# Patient Record
Sex: MALE | Race: WHITE
[De-identification: names, ages, dates, MRNs, and addresses within clinical notes are randomized per-mention and may not be internally consistent; named-entity substitution may affect disease eponyms.]

---

## 2020-01-06 ENCOUNTER — HOSPITAL ENCOUNTER (OUTPATIENT)
Dept: HOSPITAL 25 - CHICATH | Age: 80
Discharge: HOME | End: 2020-01-06
Attending: SPECIALIST
Payer: MEDICARE

## 2020-01-06 VITALS — SYSTOLIC BLOOD PRESSURE: 111 MMHG | DIASTOLIC BLOOD PRESSURE: 69 MMHG

## 2020-01-06 DIAGNOSIS — I25.10: Primary | ICD-10-CM

## 2020-01-06 DIAGNOSIS — Z87.891: ICD-10-CM

## 2020-01-06 DIAGNOSIS — Z88.1: ICD-10-CM

## 2020-01-06 DIAGNOSIS — I10: ICD-10-CM

## 2020-01-06 DIAGNOSIS — R94.31: ICD-10-CM

## 2020-01-06 DIAGNOSIS — Z85.46: ICD-10-CM

## 2020-01-06 DIAGNOSIS — R94.39: ICD-10-CM

## 2020-01-06 DIAGNOSIS — R07.9: ICD-10-CM

## 2020-01-06 LAB
ANION GAP SERPL CALC-SCNC: 5 MMOL/L (ref 2–11)
BASOPHILS # BLD AUTO: 0.1 10^3/UL (ref 0–0.2)
BUN SERPL-MCNC: 19 MG/DL (ref 6–24)
BUN/CREAT SERPL: 24.4 (ref 8–20)
CALCIUM SERPL-MCNC: 8.2 MG/DL (ref 8.6–10.3)
CHLORIDE SERPL-SCNC: 107 MMOL/L (ref 101–111)
EOSINOPHIL # BLD AUTO: 0.1 10^3/UL (ref 0–0.6)
GLUCOSE SERPL-MCNC: 100 MG/DL (ref 70–100)
HCO3 SERPL-SCNC: 26 MMOL/L (ref 22–32)
HCT VFR BLD AUTO: 29 % (ref 42–52)
HGB BLD-MCNC: 9.3 G/DL (ref 14–18)
LYMPHOCYTES # BLD AUTO: 1.5 10^3/UL (ref 1–4.8)
MCH RBC QN AUTO: 20 PG (ref 27–31)
MCHC RBC AUTO-ENTMCNC: 32 G/DL (ref 31–36)
MCV RBC AUTO: 63 FL (ref 80–94)
MICROCYTES BLD QL SMEAR: (no result)
MONOCYTES # BLD AUTO: 0.6 10^3/UL (ref 0–0.8)
NEUTROPHILS # BLD AUTO: 2.8 10^3/UL (ref 1.5–7.7)
NRBC # BLD AUTO: 0 10^3/UL
NRBC BLD QL AUTO: 0.1
PLATELET # BLD AUTO: 238 10^3/UL (ref 150–450)
POLYCHROMASIA BLD QL SMEAR: (no result)
POTASSIUM SERPL-SCNC: (no result) MMOL/L (ref 3.5–5)
RBC # BLD AUTO: 4.6 10^6 /UL (ref 4.18–5.48)
SODIUM SERPL-SCNC: 138 MMOL/L (ref 135–145)
WBC # BLD AUTO: 5.1 10^3/UL (ref 3.5–10.8)

## 2020-01-06 PROCEDURE — 93454 CORONARY ARTERY ANGIO S&I: CPT

## 2020-01-06 PROCEDURE — 85060 BLOOD SMEAR INTERPRETATION: CPT

## 2020-01-06 PROCEDURE — 99156 MOD SED OTH PHYS/QHP 5/>YRS: CPT

## 2020-01-06 PROCEDURE — 80048 BASIC METABOLIC PNL TOTAL CA: CPT

## 2020-01-06 PROCEDURE — 85025 COMPLETE CBC W/AUTO DIFF WBC: CPT

## 2020-01-06 PROCEDURE — C1887 CATHETER, GUIDING: HCPCS

## 2020-01-06 PROCEDURE — 99157 MOD SED OTHER PHYS/QHP EA: CPT

## 2020-01-06 PROCEDURE — 36415 COLL VENOUS BLD VENIPUNCTURE: CPT

## 2020-01-06 NOTE — CATH
*[facility]*

[place] [address]

Main: [phone]

Fax: [phone]

[email or web address]



-------------------------------------------------------------------

Cardiac Catheterization



Patient: Osorio Kearney                MRN:        [id number]

:     1940                         Study Date: 2020

Age:     79                                 Accession#: [id number]

Gender:  M                                  HR:

Height:  64 in /162.6 cm                    BSA:        2.04 m^2

Weight:  196 lb /89.1 kg                    BMI:        33.7 kg/m^2



Cardiologist:        Kelton Nicole MD

 

Ordering Physician:  Kelton Nicole MD

Referring Physician: Kelton Nicole MD,

 

-------------------------------------------------------------------



- Left coronary angiography.

- Right coronary angiography.

- Left heart catheterization.



-------------------------------------------------------------------



Summary:   Right posterior descending:  Ostial lesion: There is a

50% stenosis.

Recommendations:  Non Critical Coronary Artery disease. Continue

hypertension medical therapy.



-------------------------------------------------------------------

History:   Risk factors:  Hypertension.



Labs, prior tests, procedures, and surgery:

Stress myocardial perfusion imaging.     Abnormal.   Low risk of

ischemia.



Blood tests:    International normalized ratio (INR) .  Serum

sodium (Na) of 138 mEq/l.  Serum creatinine (current admission) of

0.78 mg/dl.  Blood urea nitrogen of 19 mg/dl.  Glucose of 100

mg/dl.  Platelet count of 238 th/ul.  White blood cell count (WBC)

of 0.01 th/ul.  Red blood cell count (RBC) of 4600 th/ul.

Hematocrit of 29 %.  Hemoglobin (pre-procedure) of 9.3 g/dl.



-------------------------------------------------------------------

Study data:   Study status:  Cardiac cath: elective.  Location:

Catheterization laboratory.  Consent:  The risks, benefits, and

alternatives to the procedure were explained to the patient and/or

their healthcare representative and written informed consent was

obtained. All available pre-procedure labs were reviewed.  Height:

162.6 cm. 64 in.  Weight:  89.1 kg. 196 lb.  Body surface area:

2.04 m^2.  Body mass index:  33.7 kg/m^2.



-------------------------------------------------------------------

Procedure:



1.  Initial setup. The patient was brought to the laboratory.

    Surface ECG leads, blood pressure measurements, and pulse

    oximetric signals were monitored. A baseline seven lead ECG was

    recorded. A time out was observed per protocol.

2.  Skin preparation. The planned puncture sites were prepped and

    draped in the usual sterile manner.

3.  Local anesthesia. 1% lidocaine was administered.

4.  Sedation. was administered.

5.  Supplemental oxygen. Oxygen, 2 L/min was administered

    throughout the procedure.

6.  Local anesthesia. 1% lidocaine (2 ml) was administered.

7.  Right radial artery access. A 6F Glidesheath Slender sheath was

    advanced into the vessel.

8.  Selective left coronary angiography. The procedure was

    attempted using a 5F TIG 4.0 catheter, but proper positioning

    could not be achieved, and the catheter was exchanged for a 6F

    JL 3.5 catheter which was advanced into the left coronary

    vessel ostium under fluoroscopic guidance. Contrast was

    injected using 2 injections. Images were obtained in multiple

    projections.

9.  Selective right coronary angiography. The procedure was

    attempted using a 6F AR 1 MOD catheter, but proper positioning

    could not be achieved, and the catheter was exchanged for a 6F

    3DRC catheter which was advanced into the right coronary vessel

    ostium under fluoroscopic guidance. Contrast was injected using

    2 injections. Images were obtained in multiple projections.

10. Right radial artery hemostasis. Vessel closure was achieved

    with a Regular Vasc Band device. Hemostasis was successfully

    obtained.

11. Left heart catheterization. A catheter was successfully

    advanced across the aortic valve to the left ventricle under

    fluoroscopic guidance.



Study completion:  Minimal estimated blood loss. All catheters

inserted during the procedure were removed. There were no apparent

complications.  Administered medications:   VALIUM (Diazepam), 5mg,

PO.  BENADRYL (Diphenhydramine), 25mg, PO.  VERSED (Midazolam),

1mg, IV.  Fentanyl, 25mcg, IV.  Aspirin, 81mg, PO.  (Radial)

Nitroglycerin, 300mcg, intra-arterially.  (Radial) Verapamil, 3mg,

intra-arterially.  (Radial) Heparin, 3,000units, intra-arterially.

Contrast:   Omnipaque 350 70 ml (total dose).  Omnipaque 350 130 ml

(wasted).  Radiation:  Fluoroscopy dose: 136.1 cGy.  Discharge:

The patient tolerated the procedure well and was discharged from

the lab in stable condition.



-------------------------------------------------------------------

Findings



Coronary arteries:   The coronary circulation is right dominant.

Left main:  Normal, 0% stenosis.

LAD:  Mildly calcified.  Mid-vessel lesion: There is a 40%

stenosis.

Left circumflex:  Normal, 0% stenosis.

Right coronary:  Normal, 0% stenosis.

Right posterior descending:  Ostial lesion: There is a 50%

stenosis.

Hemodynamics:



+-------------------------+--------------------------+

|Stage description        |Condition 1 -             |

+-------------------------+--------------------------+

|LV pressure s/d, ed      |93/4, 5, dP/jv=541 mm Hg/s|

+-------------------------+--------------------------+

|Arterial pressure s/d (m)|113/72 (89)               |

+-------------------------+--------------------------+



Prepared and electronically signed by



Kelton Nicole MD

2020 15:51